# Patient Record
Sex: MALE
[De-identification: names, ages, dates, MRNs, and addresses within clinical notes are randomized per-mention and may not be internally consistent; named-entity substitution may affect disease eponyms.]

---

## 2021-04-14 ENCOUNTER — HOSPITAL ENCOUNTER (EMERGENCY)
Dept: HOSPITAL 53 - M ED | Age: 21
Discharge: HOME | End: 2021-04-14
Payer: MEDICAID

## 2021-04-14 VITALS — DIASTOLIC BLOOD PRESSURE: 73 MMHG | SYSTOLIC BLOOD PRESSURE: 137 MMHG

## 2021-04-14 VITALS — HEIGHT: 69 IN | BODY MASS INDEX: 42.42 KG/M2 | WEIGHT: 286.38 LBS
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
## 2021-04-14 DIAGNOSIS — S01.01XA: Primary | ICD-10-CM

## 2021-04-14 DIAGNOSIS — T14.8XXA: ICD-10-CM
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
## 2021-04-14 NOTE — REPVR
PROCEDURE INFORMATION: 

Exam: CT Head Without Contrast 

Exam date and time: 4/14/2021 7:57 PM 

Age: 20 years old 

Clinical indication: Injury or trauma; Fall; Blunt trauma (contusions or 

hematomas); Additional info: Fall with head injury 



TECHNIQUE: 

Imaging protocol: Computed tomography of the head without contrast. 

Radiation optimization: All CT scans at this facility use at least one of these 

dose optimization techniques: automated exposure control; mA and/or kV 

adjustment per patient size (includes targeted exams where dose is matched to 

clinical indication); or iterative reconstruction. 



COMPARISON: 

No relevant prior studies available. 



FINDINGS: 

Brain: Normal. No hemorrhage. Unremarkable white matter. No mass effect. 

Cerebral ventricles: No ventriculomegaly. 

Bones/joints: Unremarkable. No acute fracture. 

Paranasal sinuses: Visualized sinuses are unremarkable. No fluid levels. 

Mastoid air cells: Visualized mastoid air cells are well aerated. 

Soft tissues: Right lateral scalp soft tissue injury greater superiorly. 



IMPRESSION: 

No acute intracranial abnormality. 



Electronically signed by: Suleman Jimenez On 04/14/2021  20:33:24 PM

## 2023-04-24 ENCOUNTER — HOSPITAL ENCOUNTER (INPATIENT)
Dept: HOSPITAL 53 - M ED | Age: 23
LOS: 4 days | Discharge: HOME | DRG: 754 | End: 2023-04-28
Attending: PSYCHIATRY & NEUROLOGY | Admitting: STUDENT IN AN ORGANIZED HEALTH CARE EDUCATION/TRAINING PROGRAM
Payer: MEDICAID

## 2023-04-24 VITALS — WEIGHT: 229.28 LBS | BODY MASS INDEX: 33.96 KG/M2 | HEIGHT: 69 IN

## 2023-04-24 DIAGNOSIS — M25.512: ICD-10-CM

## 2023-04-24 DIAGNOSIS — Z63.0: ICD-10-CM

## 2023-04-24 DIAGNOSIS — M25.511: ICD-10-CM

## 2023-04-24 DIAGNOSIS — F32.A: Primary | ICD-10-CM

## 2023-04-24 DIAGNOSIS — R45.850: ICD-10-CM

## 2023-04-24 DIAGNOSIS — R45.851: ICD-10-CM

## 2023-04-24 DIAGNOSIS — Z63.4: ICD-10-CM

## 2023-04-24 LAB
ALBUMIN SERPL BCG-MCNC: 4.5 G/DL (ref 3.2–5.2)
ALP SERPL-CCNC: 79 U/L (ref 46–116)
ALT SERPL W P-5'-P-CCNC: 11 U/L (ref 7–40)
AMPHETAMINES UR QL SCN: NEGATIVE
APAP SERPL-MCNC: < 2 UG/ML (ref 10–20)
AST SERPL-CCNC: 12 U/L (ref ?–34)
BARBITURATES UR QL SCN: NEGATIVE
BENZODIAZ UR QL SCN: NEGATIVE
BILIRUB CONJ SERPL-MCNC: 0.2 MG/DL (ref ?–0.4)
BILIRUB SERPL-MCNC: 0.6 MG/DL (ref 0.3–1.2)
BUN SERPL-MCNC: 14 MG/DL (ref 9–23)
BZE UR QL SCN: NEGATIVE
CALCIUM SERPL-MCNC: 9.3 MG/DL (ref 8.5–10.1)
CANNABINOIDS UR QL SCN: POSITIVE
CHLORIDE SERPL-SCNC: 105 MMOL/L (ref 98–107)
CO2 SERPL-SCNC: 28 MMOL/L (ref 20–31)
CREAT SERPL-MCNC: 1 MG/DL (ref 0.7–1.3)
ETHANOL SERPL-MCNC: < 0.003 % (ref 0–0.01)
GFR SERPL CREATININE-BSD FRML MDRD: > 60 ML/MIN/{1.73_M2} (ref 60–?)
GLUCOSE SERPL-MCNC: 87 MG/DL (ref 60–100)
HCT VFR BLD AUTO: 49.1 % (ref 42–52)
HGB BLD-MCNC: 16.5 G/DL (ref 13.5–17.5)
MCH RBC QN AUTO: 30.1 PG (ref 27–33)
MCHC RBC AUTO-ENTMCNC: 33.6 G/DL (ref 32–36.5)
MCV RBC AUTO: 89.4 FL (ref 80–96)
METHADONE UR QL SCN: NEGATIVE
OPIATES UR QL SCN: NEGATIVE
PCP UR QL SCN: NEGATIVE
PLATELET # BLD AUTO: 244 10^3/UL (ref 150–450)
POTASSIUM SERPL-SCNC: 4.1 MMOL/L (ref 3.5–5.1)
PROT SERPL-MCNC: 7.6 G/DL (ref 5.7–8.2)
RBC # BLD AUTO: 5.49 10^6/UL (ref 4.3–6.1)
SALICYLATES SERPL-MCNC: < 3 MG/DL (ref ?–30)
SODIUM SERPL-SCNC: 139 MMOL/L (ref 136–145)
TSH SERPL DL<=0.005 MIU/L-ACNC: 0.66 UIU/ML (ref 0.55–4.78)
WBC # BLD AUTO: 8.6 10^3/UL (ref 4–10)

## 2023-04-24 SDOH — SOCIAL STABILITY - SOCIAL INSECURITY: DISSAPEARANCE AND DEATH OF FAMILY MEMBER: Z63.4

## 2023-04-24 SDOH — SOCIAL STABILITY - SOCIAL INSECURITY: PROBLEMS IN RELATIONSHIP WITH SPOUSE OR PARTNER: Z63.0

## 2023-04-25 VITALS — DIASTOLIC BLOOD PRESSURE: 80 MMHG | SYSTOLIC BLOOD PRESSURE: 150 MMHG

## 2023-04-25 RX ADMIN — OLANZAPINE PRN MG: 5 TABLET, ORALLY DISINTEGRATING ORAL at 18:48

## 2023-04-26 VITALS — DIASTOLIC BLOOD PRESSURE: 65 MMHG | SYSTOLIC BLOOD PRESSURE: 148 MMHG

## 2023-04-26 VITALS — DIASTOLIC BLOOD PRESSURE: 83 MMHG | SYSTOLIC BLOOD PRESSURE: 126 MMHG

## 2023-04-26 RX ADMIN — OLANZAPINE PRN MG: 5 TABLET, ORALLY DISINTEGRATING ORAL at 21:03

## 2023-04-27 VITALS — SYSTOLIC BLOOD PRESSURE: 140 MMHG | DIASTOLIC BLOOD PRESSURE: 78 MMHG

## 2023-04-27 VITALS — SYSTOLIC BLOOD PRESSURE: 131 MMHG | DIASTOLIC BLOOD PRESSURE: 83 MMHG

## 2023-04-27 RX ADMIN — OLANZAPINE PRN MG: 5 TABLET, ORALLY DISINTEGRATING ORAL at 21:21

## 2023-04-28 VITALS — SYSTOLIC BLOOD PRESSURE: 137 MMHG | DIASTOLIC BLOOD PRESSURE: 71 MMHG
